# Patient Record
Sex: MALE | Race: WHITE | NOT HISPANIC OR LATINO | Employment: FULL TIME | URBAN - METROPOLITAN AREA
[De-identification: names, ages, dates, MRNs, and addresses within clinical notes are randomized per-mention and may not be internally consistent; named-entity substitution may affect disease eponyms.]

---

## 2024-07-22 ENCOUNTER — HOSPITAL ENCOUNTER (OUTPATIENT)
Dept: RADIOLOGY | Facility: CLINIC | Age: 24
Discharge: HOME | End: 2024-07-22
Payer: MEDICARE

## 2024-07-22 DIAGNOSIS — R06.00 DYSPNEA, UNSPECIFIED: ICD-10-CM

## 2024-07-22 PROCEDURE — 71046 X-RAY EXAM CHEST 2 VIEWS: CPT

## 2024-10-03 ENCOUNTER — LAB (OUTPATIENT)
Dept: LAB | Facility: LAB | Age: 24
End: 2024-10-03
Payer: MEDICARE

## 2024-10-03 DIAGNOSIS — J45.909 ASTHMA: Primary | ICD-10-CM

## 2024-10-03 LAB
BASOPHILS # BLD AUTO: 0.04 X10*3/UL (ref 0–0.1)
BASOPHILS NFR BLD AUTO: 0.3 %
EOSINOPHIL # BLD AUTO: 0.13 X10*3/UL (ref 0–0.7)
EOSINOPHIL NFR BLD AUTO: 1.1 %
ERYTHROCYTE [DISTWIDTH] IN BLOOD BY AUTOMATED COUNT: 12.1 % (ref 11.5–14.5)
HCT VFR BLD AUTO: 45.9 % (ref 41–52)
HGB BLD-MCNC: 15.2 G/DL (ref 13.5–17.5)
IMM GRANULOCYTES # BLD AUTO: 0.06 X10*3/UL (ref 0–0.7)
IMM GRANULOCYTES NFR BLD AUTO: 0.5 % (ref 0–0.9)
LYMPHOCYTES # BLD AUTO: 1.34 X10*3/UL (ref 1.2–4.8)
LYMPHOCYTES NFR BLD AUTO: 10.9 %
MCH RBC QN AUTO: 28.9 PG (ref 26–34)
MCHC RBC AUTO-ENTMCNC: 33.1 G/DL (ref 32–36)
MCV RBC AUTO: 87 FL (ref 80–100)
MONOCYTES # BLD AUTO: 1.16 X10*3/UL (ref 0.1–1)
MONOCYTES NFR BLD AUTO: 9.4 %
NEUTROPHILS # BLD AUTO: 9.61 X10*3/UL (ref 1.2–7.7)
NEUTROPHILS NFR BLD AUTO: 77.8 %
NRBC BLD-RTO: 0 /100 WBCS (ref 0–0)
PLATELET # BLD AUTO: 281 X10*3/UL (ref 150–450)
RBC # BLD AUTO: 5.26 X10*6/UL (ref 4.5–5.9)
WBC # BLD AUTO: 12.3 X10*3/UL (ref 4.4–11.3)

## 2024-10-03 PROCEDURE — 85025 COMPLETE CBC W/AUTO DIFF WBC: CPT

## 2024-10-03 PROCEDURE — 36415 COLL VENOUS BLD VENIPUNCTURE: CPT

## 2024-10-28 ENCOUNTER — LAB (OUTPATIENT)
Dept: LAB | Facility: LAB | Age: 24
End: 2024-10-28
Payer: MEDICARE

## 2024-10-28 ENCOUNTER — OFFICE VISIT (OUTPATIENT)
Dept: PULMONOLOGY | Facility: CLINIC | Age: 24
End: 2024-10-28
Payer: MEDICARE

## 2024-10-28 VITALS
RESPIRATION RATE: 16 BRPM | OXYGEN SATURATION: 98 % | HEART RATE: 71 BPM | SYSTOLIC BLOOD PRESSURE: 107 MMHG | DIASTOLIC BLOOD PRESSURE: 74 MMHG | WEIGHT: 229.3 LBS

## 2024-10-28 DIAGNOSIS — J45.40 MODERATE PERSISTENT ASTHMA WITHOUT COMPLICATION (HHS-HCC): ICD-10-CM

## 2024-10-28 DIAGNOSIS — G47.33 OSA (OBSTRUCTIVE SLEEP APNEA): Primary | ICD-10-CM

## 2024-10-28 PROCEDURE — 99205 OFFICE O/P NEW HI 60 MIN: CPT | Performed by: INTERNAL MEDICINE

## 2024-10-28 PROCEDURE — 86003 ALLG SPEC IGE CRUDE XTRC EA: CPT

## 2024-10-28 PROCEDURE — 36415 COLL VENOUS BLD VENIPUNCTURE: CPT

## 2024-10-28 PROCEDURE — 99215 OFFICE O/P EST HI 40 MIN: CPT | Performed by: INTERNAL MEDICINE

## 2024-10-28 PROCEDURE — 82785 ASSAY OF IGE: CPT

## 2024-10-28 RX ORDER — DESLORATADINE 5 MG/1
1 TABLET ORAL
COMMUNITY
Start: 2024-10-04

## 2024-10-28 RX ORDER — ALBUTEROL SULFATE 90 UG/1
2 INHALANT RESPIRATORY (INHALATION) EVERY 6 HOURS PRN
COMMUNITY
Start: 2024-10-18

## 2024-10-28 RX ORDER — FLUTICASONE PROPIONATE AND SALMETEROL 250; 50 UG/1; UG/1
1 POWDER RESPIRATORY (INHALATION)
COMMUNITY
Start: 2024-10-15

## 2024-10-28 RX ORDER — FLUTICASONE PROPIONATE 50 MCG
2 SPRAY, SUSPENSION (ML) NASAL DAILY
COMMUNITY
Start: 2024-10-18

## 2024-10-28 ASSESSMENT — ENCOUNTER SYMPTOMS
CHILLS: 0
PSYCHIATRIC NEGATIVE: 1
GASTROINTESTINAL NEGATIVE: 1
COUGH: 1
NEUROLOGICAL NEGATIVE: 1
CARDIOVASCULAR NEGATIVE: 1
FEVER: 0

## 2024-10-28 ASSESSMENT — COLUMBIA-SUICIDE SEVERITY RATING SCALE - C-SSRS
6. HAVE YOU EVER DONE ANYTHING, STARTED TO DO ANYTHING, OR PREPARED TO DO ANYTHING TO END YOUR LIFE?: NO
2. HAVE YOU ACTUALLY HAD ANY THOUGHTS OF KILLING YOURSELF?: NO
1. IN THE PAST MONTH, HAVE YOU WISHED YOU WERE DEAD OR WISHED YOU COULD GO TO SLEEP AND NOT WAKE UP?: NO

## 2024-10-28 ASSESSMENT — PATIENT HEALTH QUESTIONNAIRE - PHQ9
SUM OF ALL RESPONSES TO PHQ9 QUESTIONS 1 AND 2: 0
2. FEELING DOWN, DEPRESSED OR HOPELESS: NOT AT ALL
1. LITTLE INTEREST OR PLEASURE IN DOING THINGS: NOT AT ALL

## 2024-10-28 ASSESSMENT — PAIN SCALES - GENERAL: PAINLEVEL_OUTOF10: 0-NO PAIN

## 2024-10-29 DIAGNOSIS — J45.40 MODERATE PERSISTENT ASTHMA WITHOUT COMPLICATION (HHS-HCC): ICD-10-CM

## 2024-10-29 LAB
A ALTERNATA IGE QN: <0.1 KU/L
A FUMIGATUS IGE QN: <0.1 KU/L
BERMUDA GRASS IGE QN: <0.1 KU/L
BOXELDER IGE QN: <0.1 KU/L
C HERBARUM IGE QN: <0.1 KU/L
CALIF WALNUT POLN IGE QN: <0.1 KU/L
CAT DANDER IGE QN: <0.1 KU/L
CMN PIGWEED IGE QN: <0.1 KU/L
COMMON RAGWEED IGE QN: <0.1 KU/L
COTTONWOOD IGE QN: <0.1 KU/L
D FARINAE IGE QN: 0.1 KU/L
D PTERONYSS IGE QN: 0.15 KU/L
DOG DANDER IGE QN: <0.1 KU/L
ENGL PLANTAIN IGE QN: <0.1 KU/L
GOOSEFOOT IGE QN: <0.1 KU/L
JOHNSON GRASS IGE QN: <0.1 KU/L
KENT BLUE GRASS IGE QN: <0.1 KU/L
LONDON PLANE IGE QN: <0.1 KU/L
MT JUNIPER IGE QN: <0.1 KU/L
P NOTATUM IGE QN: <0.1 KU/L
PECAN/HICK TREE IGE QN: <0.1 KU/L
ROACH IGE QN: 0.16 KU/L
SALTWORT IGE QN: <0.1 KU/L
SHEEP SORREL IGE QN: <0.1 KU/L
SILVER BIRCH IGE QN: <0.1 KU/L
TIMOTHY IGE QN: <0.1 KU/L
TOTAL IGE SMQN RAST: 191 KU/L
WHITE ASH IGE QN: <0.1 KU/L
WHITE ELM IGE QN: <0.1 KU/L
WHITE MULBERRY IGE QN: <0.1 KU/L
WHITE OAK IGE QN: <0.1 KU/L

## 2024-10-29 RX ORDER — BUDESONIDE AND FORMOTEROL FUMARATE DIHYDRATE 80; 4.5 UG/1; UG/1
2 AEROSOL RESPIRATORY (INHALATION)
Qty: 10.2 G | Refills: 11 | Status: SHIPPED | OUTPATIENT
Start: 2024-10-29 | End: 2025-10-29

## 2024-11-11 DIAGNOSIS — J45.40 MODERATE PERSISTENT ASTHMA WITHOUT COMPLICATION (HHS-HCC): ICD-10-CM

## 2024-11-11 RX ORDER — FLUTICASONE PROPIONATE AND SALMETEROL 250; 50 UG/1; UG/1
1 POWDER RESPIRATORY (INHALATION)
Qty: 60 EACH | Refills: 11 | Status: CANCELLED | OUTPATIENT
Start: 2024-11-11

## 2024-11-12 DIAGNOSIS — J45.40 MODERATE PERSISTENT ASTHMA WITHOUT COMPLICATION (HHS-HCC): ICD-10-CM

## 2024-11-14 RX ORDER — BUDESONIDE AND FORMOTEROL FUMARATE 80; 4.5 UG/1; UG/1
2 AEROSOL, METERED RESPIRATORY (INHALATION)
Qty: 10.2 G | Refills: 11 | Status: SHIPPED | OUTPATIENT
Start: 2024-11-14 | End: 2025-11-14

## 2024-11-14 RX ORDER — BUDESONIDE AND FORMOTEROL FUMARATE 160; 4.5 UG/1; UG/1
2 AEROSOL, METERED RESPIRATORY (INHALATION)
Qty: 10.2 G | Refills: 11 | Status: SHIPPED | OUTPATIENT
Start: 2024-11-14 | End: 2024-11-14 | Stop reason: WASHOUT

## 2025-01-13 ENCOUNTER — APPOINTMENT (OUTPATIENT)
Dept: PULMONOLOGY | Facility: CLINIC | Age: 25
End: 2025-01-13
Payer: MEDICARE

## 2025-01-23 ENCOUNTER — OFFICE VISIT (OUTPATIENT)
Dept: PULMONOLOGY | Facility: CLINIC | Age: 25
End: 2025-01-23
Payer: MEDICARE

## 2025-01-23 VITALS
SYSTOLIC BLOOD PRESSURE: 122 MMHG | OXYGEN SATURATION: 99 % | HEART RATE: 72 BPM | WEIGHT: 233.3 LBS | DIASTOLIC BLOOD PRESSURE: 75 MMHG | RESPIRATION RATE: 16 BRPM

## 2025-01-23 DIAGNOSIS — J45.40 MODERATE PERSISTENT ASTHMA WITHOUT COMPLICATION (HHS-HCC): ICD-10-CM

## 2025-01-23 DIAGNOSIS — G47.33 OSA (OBSTRUCTIVE SLEEP APNEA): ICD-10-CM

## 2025-01-23 DIAGNOSIS — J32.9 CHRONIC SINUSITIS, UNSPECIFIED LOCATION: Primary | ICD-10-CM

## 2025-01-23 DIAGNOSIS — J32.8 OTHER CHRONIC SINUSITIS: ICD-10-CM

## 2025-01-23 PROCEDURE — 99213 OFFICE O/P EST LOW 20 MIN: CPT | Performed by: INTERNAL MEDICINE

## 2025-01-23 ASSESSMENT — PATIENT HEALTH QUESTIONNAIRE - PHQ9
1. LITTLE INTEREST OR PLEASURE IN DOING THINGS: NOT AT ALL
2. FEELING DOWN, DEPRESSED OR HOPELESS: NOT AT ALL
SUM OF ALL RESPONSES TO PHQ9 QUESTIONS 1 AND 2: 0

## 2025-01-23 ASSESSMENT — ENCOUNTER SYMPTOMS
COUGH: 1
FEVER: 0
GASTROINTESTINAL NEGATIVE: 1
CARDIOVASCULAR NEGATIVE: 1
PSYCHIATRIC NEGATIVE: 1
CHILLS: 0
NEUROLOGICAL NEGATIVE: 1

## 2025-01-23 ASSESSMENT — PAIN SCALES - GENERAL: PAINLEVEL_OUTOF10: 0-NO PAIN

## 2025-01-23 NOTE — PROGRESS NOTES
Subjective   Patient ID: Abbe Saini is a 24 y.o. male who presents for No chief complaint on file..  H/o asthma, BABS here for follow-up.  Severe obstruction + BD 7/2024.  Currently on Breyna.  Thinks breathing is better.  Using rescue 1-2x/week.  No fevers, chills.  Some cough. Difficult to expectorate.  Using flonase and saline sinus wash and thinks it helps a little.  ET is unrestricted.          Review of Systems   Constitutional:  Negative for chills and fever.   HENT:  Positive for congestion.    Respiratory:  Positive for cough.    Cardiovascular: Negative.    Gastrointestinal: Negative.    Skin:  Negative for rash.   Neurological: Negative.    Psychiatric/Behavioral: Negative.     All other systems reviewed and are negative.      Objective   Physical Exam  Vitals reviewed.   Constitutional:       Appearance: Normal appearance.   HENT:      Head: Normocephalic and atraumatic.   Eyes:      Extraocular Movements: Extraocular movements intact.   Cardiovascular:      Rate and Rhythm: Normal rate and regular rhythm.      Heart sounds: Normal heart sounds.   Pulmonary:      Effort: Pulmonary effort is normal.      Breath sounds: Normal breath sounds.   Abdominal:      Palpations: Abdomen is soft.      Tenderness: There is no abdominal tenderness.   Musculoskeletal:      Cervical back: Normal range of motion.   Skin:     General: Skin is warm.   Neurological:      General: No focal deficit present.      Mental Status: He is alert and oriented to person, place, and time. Mental status is at baseline.   Psychiatric:         Mood and Affect: Mood normal.         Behavior: Behavior normal.         Assessment/Plan   Problem List Items Addressed This Visit       Moderate persistent asthma without complication ([facility])     Severe obstruction + BD (7/2024).  RAST + for dust mites, cockroach.  Advair caused GI upset and only used for a week.  Cont Breyna 2 puff bid. Spacer given.   Flutter valve given         BABS  (obstructive sleep apnea)     Mild BABS on sleep study.  On AutoPAP 5-15cm.  Pt wears 100% of nights for 86v10gow w/ AHI 1.9.  Having significant leak.  Will order nasal gel pads.  Consider adjusting pressures if still having issues.  Check compliance next visit         Chronic sinusitis - Primary     UACS component, using flonase and saline sinus wash.  Will refer to ENT.          RTC 6 months    Time Spent  Prep time on day of patient encounter: 5 minutes  Time spent directly with patient, family or caregiver: 10 minutes  Additional Time Spent on Patient Care Activities: 0 minutes  Documentation Time: 5 minutes  Other Time Spent: 0 minutes  Total: 20 minutes        Toi Schneider MD 01/23/25 11:11 AM

## 2025-01-23 NOTE — ASSESSMENT & PLAN NOTE
Severe obstruction + BD (7/2024).  RAST + for dust mites, cockroach.  Advair caused GI upset and only used for a week.  Cont Breyna 2 puff bid. Spacer given.   Flutter valve given

## 2025-01-23 NOTE — ASSESSMENT & PLAN NOTE
Mild BABS on sleep study.  On AutoPAP 5-15cm.  Pt wears 100% of nights for 26x49rxp w/ AHI 1.9.  Having significant leak.  Will order nasal gel pads.  Consider adjusting pressures if still having issues.  Check compliance next visit

## 2025-01-28 ENCOUNTER — APPOINTMENT (OUTPATIENT)
Dept: PULMONOLOGY | Facility: CLINIC | Age: 25
End: 2025-01-28
Payer: MEDICARE

## 2025-04-28 ENCOUNTER — OFFICE VISIT (OUTPATIENT)
Dept: PULMONOLOGY | Facility: CLINIC | Age: 25
End: 2025-04-28
Payer: MEDICARE

## 2025-04-28 VITALS
OXYGEN SATURATION: 97 % | HEART RATE: 81 BPM | HEIGHT: 71 IN | BODY MASS INDEX: 29.69 KG/M2 | DIASTOLIC BLOOD PRESSURE: 76 MMHG | WEIGHT: 212.1 LBS | RESPIRATION RATE: 16 BRPM | SYSTOLIC BLOOD PRESSURE: 128 MMHG

## 2025-04-28 DIAGNOSIS — G47.33 OSA (OBSTRUCTIVE SLEEP APNEA): ICD-10-CM

## 2025-04-28 DIAGNOSIS — J45.40 MODERATE PERSISTENT ASTHMA WITHOUT COMPLICATION (HHS-HCC): Primary | ICD-10-CM

## 2025-04-28 DIAGNOSIS — J32.9 CHRONIC SINUSITIS, UNSPECIFIED LOCATION: ICD-10-CM

## 2025-04-28 PROCEDURE — 99214 OFFICE O/P EST MOD 30 MIN: CPT | Performed by: INTERNAL MEDICINE

## 2025-04-28 PROCEDURE — 3008F BODY MASS INDEX DOCD: CPT | Performed by: INTERNAL MEDICINE

## 2025-04-28 ASSESSMENT — COLUMBIA-SUICIDE SEVERITY RATING SCALE - C-SSRS
1. IN THE PAST MONTH, HAVE YOU WISHED YOU WERE DEAD OR WISHED YOU COULD GO TO SLEEP AND NOT WAKE UP?: NO
2. HAVE YOU ACTUALLY HAD ANY THOUGHTS OF KILLING YOURSELF?: NO
6. HAVE YOU EVER DONE ANYTHING, STARTED TO DO ANYTHING, OR PREPARED TO DO ANYTHING TO END YOUR LIFE?: NO

## 2025-04-28 ASSESSMENT — ENCOUNTER SYMPTOMS
CARDIOVASCULAR NEGATIVE: 1
GASTROINTESTINAL NEGATIVE: 1
FEVER: 0
CHILLS: 0
DEPRESSION: 0
COUGH: 1
NEUROLOGICAL NEGATIVE: 1
PSYCHIATRIC NEGATIVE: 1

## 2025-04-28 ASSESSMENT — PATIENT HEALTH QUESTIONNAIRE - PHQ9
2. FEELING DOWN, DEPRESSED OR HOPELESS: NOT AT ALL
1. LITTLE INTEREST OR PLEASURE IN DOING THINGS: NOT AT ALL
SUM OF ALL RESPONSES TO PHQ9 QUESTIONS 1 AND 2: 0

## 2025-04-28 ASSESSMENT — PAIN SCALES - GENERAL: PAINLEVEL_OUTOF10: 0-NO PAIN

## 2025-04-28 NOTE — ASSESSMENT & PLAN NOTE
Mild BABS on sleep study.  On AutoPAP 5-15cm.  Pt wears 100% of nights for 55p57nep w/ AHI 2.6.  Will try CPAP 10cm instead to see if works better for him.  If he does not feel improved, will order HST.  Check compliance next visit

## 2025-04-28 NOTE — ASSESSMENT & PLAN NOTE
Severe obstruction + BD (7/2024).  RAST + for dust mites, cockroach.  Advair caused GI upset and only used for a week.  Cont Breyna 2 puff bid. Spacer given.   Flutter valve given.  Advised pt to use his albuterol then flutter when he has difficulty with his sputum.

## 2025-04-28 NOTE — PROGRESS NOTES
Subjective   Patient ID: Abbe Saini is a 25 y.o. male who presents for Lung Eval.  H/o asthma, BABS here for follow-up.  Severe obstruction + BD 7/2024.  Currently on Breyna.  When he coughs, thinks something gets stuck.  Has not seen ENT.  No fevers or chills.  Rarely uses rescue.  Using flonase and saline sinus wash.  ET is unrestricted.  Reports that his sleep is fragmented.  Wakes up with dry mouth and eye crusting despite adjusting his CPAP humidity.  Also has had 15lb weight loss since January.        Review of Systems   Constitutional:  Negative for chills and fever.   Respiratory:  Positive for cough.    Cardiovascular: Negative.    Gastrointestinal: Negative.    Skin:  Negative for rash.   Neurological: Negative.    Psychiatric/Behavioral: Negative.     All other systems reviewed and are negative.      Objective   Physical Exam  Vitals reviewed.   Constitutional:       Appearance: Normal appearance.   HENT:      Head: Normocephalic and atraumatic.   Eyes:      Extraocular Movements: Extraocular movements intact.   Cardiovascular:      Rate and Rhythm: Normal rate and regular rhythm.      Heart sounds: Normal heart sounds.   Pulmonary:      Effort: Pulmonary effort is normal.      Breath sounds: Normal breath sounds.   Abdominal:      Palpations: Abdomen is soft.      Tenderness: There is no abdominal tenderness.   Musculoskeletal:      Cervical back: Normal range of motion.   Skin:     General: Skin is warm.   Neurological:      General: No focal deficit present.      Mental Status: He is alert and oriented to person, place, and time. Mental status is at baseline.   Psychiatric:         Mood and Affect: Mood normal.         Behavior: Behavior normal.         Assessment/Plan   Problem List Items Addressed This Visit       Moderate persistent asthma without complication ([facility]) - Primary    Severe obstruction + BD (7/2024).  RAST + for dust mites, cockroach.  Advair caused GI upset and only used for a  week.  Cont Breyna 2 puff bid. Spacer given.   Flutter valve given.  Advised pt to use his albuterol then flutter when he has difficulty with his sputum.         BABS (obstructive sleep apnea)    Mild BABS on sleep study.  On AutoPAP 5-15cm.  Pt wears 100% of nights for [id number] w/ AHI 2.6.  Will try CPAP 10cm instead to see if works better for him.  If he does not feel improved, will order HST.  Check compliance next visit         Relevant Orders    Positive Airway Pressure (PAP) Therapy    Chronic sinusitis    UACS component, using flonase and saline sinus wash.  To see ENT in July          RTC 4-6 months    Time Spent  Prep time on day of patient encounter: 5 minutes  Time spent directly with patient, family or caregiver: 20 minutes  Additional Time Spent on Patient Care Activities: 0 minutes  Documentation Time: 5 minutes  Other Time Spent: 0 minutes  Total: 30 minutes        Toi Schneider MD 04/28/25 4:19 PM

## 2025-07-28 ENCOUNTER — APPOINTMENT (OUTPATIENT)
Dept: OTOLARYNGOLOGY | Facility: CLINIC | Age: 25
End: 2025-07-28
Payer: MEDICARE

## 2025-07-28 ENCOUNTER — APPOINTMENT (OUTPATIENT)
Dept: PULMONOLOGY | Facility: CLINIC | Age: 25
End: 2025-07-28
Payer: MEDICARE

## 2025-07-28 VITALS — BODY MASS INDEX: 26.6 KG/M2 | TEMPERATURE: 98.9 F | HEIGHT: 71 IN | WEIGHT: 190 LBS

## 2025-07-28 DIAGNOSIS — J34.3 NASAL TURBINATE HYPERTROPHY: ICD-10-CM

## 2025-07-28 DIAGNOSIS — J30.9 ALLERGIC RHINITIS, UNSPECIFIED SEASONALITY, UNSPECIFIED TRIGGER: ICD-10-CM

## 2025-07-28 DIAGNOSIS — G47.33 OBSTRUCTIVE SLEEP APNEA: ICD-10-CM

## 2025-07-28 DIAGNOSIS — J34.2 DEVIATED NASAL SEPTUM: Primary | ICD-10-CM

## 2025-07-28 PROCEDURE — 99204 OFFICE O/P NEW MOD 45 MIN: CPT | Performed by: OTOLARYNGOLOGY

## 2025-07-28 PROCEDURE — 3008F BODY MASS INDEX DOCD: CPT | Performed by: OTOLARYNGOLOGY

## 2025-07-28 PROCEDURE — 31231 NASAL ENDOSCOPY DX: CPT | Performed by: OTOLARYNGOLOGY

## 2025-07-28 RX ORDER — ESCITALOPRAM OXALATE 10 MG/1
TABLET ORAL
COMMUNITY
Start: 2025-07-27

## 2025-07-28 NOTE — PROGRESS NOTES
Chief Complaint   Patient presents with    New Patient Visit     NP IS HERE FOR NASAL CONGESTION X 2 YEARS       Date of Evaluation: 7/28/2025   Abbe was seen today for new patient visit.  Diagnoses and all orders for this visit:  Deviated nasal septum (Primary)  Nasal turbinate hypertrophy  Allergic rhinitis, unspecified seasonality, unspecified trigger  Obstructive sleep apnea       PLAN  Assessment & Plan  1. Nasal obstruction.  Nasal obstruction is likely exacerbating asthma symptoms. The use of Clarinex and Flonase has not significantly improved nasal breathing due to a severely deviated septum and enlarged turbinates. Surgical intervention to correct the internal structure of the nose was recommended. The procedure involves straightening the septum and reducing the size of the turbinates. Risks associated with the surgery, including bleeding (<1-5%), infection (<1-5%), septal perforation (<1%), and potential decrease in sense of smell (<1%), were discussed. The surgery is expected to improve nasal breathing, which may alleviate asthma symptoms and potentially reduce the need for CPAP therapy. The surgery will be scheduled for 12/2025.  I have recommended septoplasty and turbinate surgery to address the nasal obstruction.  I discussed the surgery in detail including the risks and benefits including but not limited to bleeding infection residual deviation septal perforation change in sense of smell or the need for revision surgery.  The patient understands the risks and would like to proceed with scheduling.  2. Sleep apnea.  Sleep apnea may be influenced by nasal obstruction and significant weight loss (40-50 pounds) since 11/2024. Improved nasal breathing post-surgery could warrant a reevaluation of the sleep apnea diagnosis. A repeat sleep study was recommended after the nasal surgery to reassess the need for CPAP therapy.    3. Headaches.  Headaches, occurring twice a week in the afternoon, are more  indicative of migraines rather than being directly related to nasal obstruction. 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 A referral to a neurologist for further evaluation and management of migraines was suggested.    PROCEDURE  Procedure: Nasal endoscopy    All questions were answered and agreement to proceed was given after the following Pre-Procedure details were reviewed:  - Consent: Verbal consent obtained    Intra-Procedure:  - Site Preparation: Application of nasal spray to numb and decongest  - Anesthesia: Topical nasal spray    Post-Procedure:  - Tolerance Level: Tolerated well  - Complications: None       HPI  History of Present Illness  The patient presents for evaluation of nasal obstruction, sleep apnea, and headaches. He is accompanied by his mother.    He was diagnosed with asthma 1.5 years ago and often breathes through his mouth due to difficulty breathing through his nose. He has been using Flonase nasal spray and a sinus rinse neti pot for the past 9 months. He reports no significant difference in obstruction between the two sides of his nose. He has tried allergy medications such as Allerga, Benadryl, and Claritin, but these have not provided relief. Benadryl made him very tired. He uses Flonase consistently every night.    He was diagnosed with sleep apnea in early 06/2024 and has been using a CPAP machine nightly since then. He finds it easier to fall asleep and breathe with the CPAP machine, but still wakes up feeling tired. He has lost approximately 40 to 50 pounds since 11/2024. His mother notes that he gained weight when he started using the CPAP machine.    He experiences frequent headaches, particularly in the afternoons around 2:30 or 3:00 PM, which persist until he goes to bed. These headaches occur twice a week and are described as frontal band-type headaches. He occasionally sees spots in his vision during these  "episodes. He has been using Advil for pain relief.       Last Recorded Vitals  Temperature 37.2 °C (98.9 °F), height 1.803 m (5' 11\"), weight 86.2 kg (190 lb). , Body mass index is 26.5 kg/m².  Physical Exam:  Physical Exam  Nose: Severely deviated septum to the left side, making it difficult to insert the scope. Turbinates are significantly enlarged, obstructing the nasal passage.  Specialty Assessment: Nasal endoscopy revealed a severely deviated septum to the left side, making it difficult to insert the scope. The turbinates are significantly enlarged, obstructing the nasal passage.  []General appearance: Well-developed, well-nourished in no acute distress, conversant with normal voice quality    Head/face: No erythema or edema or facial tenderness, and normal facial nerve function bilaterally    External ear: Clear external auditory canals with normal pinnae  Tube status: N/A  Middle ear: Tympanic membranes intact and mobile, middle ears normal.  Tympanic membrane perforation: N/A  Mastoid bowl: N/A  Hearing: Normal conversational awareness at normal speech thresholds    Nose visualized using: Nasal endoscopy  Nasal dorsum: Nontraumatic midline appearance  Septum: Severely deviated towards the left  Inferior turbinates: Normal, pink hypertrophied  Secretions: Dry    Oral cavity and oropharynx: Normal  Teeth: Good condition  Floor of mouth: without lesions  Palate: Normal hard palate, soft palate and uvula  Oropharynx: Clear, no lesions present  Buccal mucosa: Normal without masses or lesions  Lips: Normal    Nasopharynx: Inadequate mirror exam secondary to gag/anatomy    Neck:  Salivary glands: Normal bilateral parotid and submandibular glands by inspection and palpation.  Non-thyroid masses: No palpable masses or significant lymphadenopathy  Trachea: Midline  Thyroid: No thyromegaly or palpable nodules  Temporomandibular joint: Nontender  Cervical range of motion: Normal    Neurologic exam: Alert and oriented " x3, appropriate affect.  Cranial nerves II-XII normal bilaterally  Extraocular movement: Extraocular movement intact, normal gaze alignment     Results  Diagnostic Testing   - Hearing test: Showed some impairment     Medical History[1]   Surgical History[2]       Medications:   Current Outpatient Medications   Medication Instructions    albuterol 90 mcg/actuation inhaler 2 puffs, Every 6 hours PRN    Breyna 80-4.5 mcg/actuation inhaler 2 puffs, inhalation, 2 times daily RT, Rinse mouth with water after use to reduce aftertaste and incidence of candidiasis. Do not swallow.    escitalopram (Lexapro) 10 mg tablet     fluticasone (Flonase) 50 mcg/actuation nasal spray 2 sprays, Daily        Allergies:  Allergies[3]         This medical note was created with the assistance of artificial intelligence (AI) for documentation purposes. The content has been reviewed and confirmed by the healthcare provider for accuracy and completeness. Patient consented to the use of audio recording and use of AI during their visit.      Phil Coffey MD       [1] History reviewed. No pertinent past medical history.  [2] History reviewed. No pertinent surgical history.  [3] No Known Allergies

## 2025-09-08 ENCOUNTER — APPOINTMENT (OUTPATIENT)
Dept: PULMONOLOGY | Facility: CLINIC | Age: 25
End: 2025-09-08
Payer: MEDICARE

## 2025-12-03 ENCOUNTER — APPOINTMENT (OUTPATIENT)
Dept: PULMONOLOGY | Facility: CLINIC | Age: 25
End: 2025-12-03
Payer: MEDICARE